# Patient Record
(demographics unavailable — no encounter records)

---

## 2025-03-12 NOTE — HISTORY OF PRESENT ILLNESS
[TextEntry] : CC: SANTI  is here for an assessment of developmental milestones; patient is at risk for developmental delay secondary to  Patient is seen for developmental/behavioral progress. History obtained from  [ ]. Due to age, patient is unable to provide comprehensive history, requiring an independent historian. Records reviewed:   Gestational Age:  32:6 weeks Chronological Age: 10  months Corrected Age: 8.4 months Caregiver concerns: .   Diet: Breast milk: Formula: Solids No chewing or texture difficulties   Sleep: - on back - in the crib; no toys/pillows in the crib - sleeps through the night - sleep associations:   Elimination habits: - constipation   early intervention (EI): none   Intercurrent Illnesses/ Hosp/ ER Visits:   Subspecialty Visits:  Primary care pediatrician: none     Birth hx: Ex 32:6 baby born to  via . .  Maternal history is significant for anxiety, depression, cigarette smocking Apgars 9/9 Birth weight: 1865 g ? Hearing test RNE=6

## 2025-03-12 NOTE — PLAN
[TextEntry] : At risk for developmental delay   Family was provided with a copy of neurodevelopmental positions  ~  ~ and handout on 'Great books for infants and Toddlers' from [facility] and handout 'Children learn through play' from [facility] Stretching exercises were demonstrated to caregiver SANTI  was provided with a book from reach out and read program Language promoting skills were discussed, such as listening to classical and nursery rhymes, limiting exposure to screens and electronic devices   No concerns re: sleep/feeding   Anticipatory guidance provided for common safety concerns (i.e. baby proofing the house)   Tummy time reviewed/discussed    Avoid placing a child in vertical suspension (no walkers, jumpers); child may be placed into the exersaucer as long as he   is on flat feet     Sleep Concern Reviewed with caregiver  safe sleep practices as per [employer].   Health maintenance:                 Toilet training discussed - reading child's cues for readiness, social modeling   Discipline discussed - ignore bad attention seeking behaviors; 'catch when good'; time out for aggression (1 minute per age); discussed extinction/burst behaviors)       All questions answered   Follow up in 6 months   Phone follow up as needed

## 2025-03-12 NOTE — FAMILY HISTORY
[TextEntry] : SANTI is the -----born child of a non-consanguineous couple. Family history was unremarkable for any individuals presenting with: developmental delay birth defects learning disabilities autism seizures musculoskeletal conditions bleeding conditions multiple miscarriages. hearing loss ADHD bipolar anxiety depression substance abuse  Family history is significant for: